# Patient Record
Sex: MALE | Race: BLACK OR AFRICAN AMERICAN | Employment: OTHER | URBAN - METROPOLITAN AREA
[De-identification: names, ages, dates, MRNs, and addresses within clinical notes are randomized per-mention and may not be internally consistent; named-entity substitution may affect disease eponyms.]

---

## 2022-06-08 ENCOUNTER — HOSPITAL ENCOUNTER (OUTPATIENT)
Dept: MRI IMAGING | Age: 57
Discharge: HOME | End: 2022-06-11
Payer: COMMERCIAL

## 2022-06-08 DIAGNOSIS — M21.372 FOOT DROP, LEFT: ICD-10-CM

## 2022-06-08 DIAGNOSIS — M54.16 LUMBAR RADICULOPATHY: ICD-10-CM

## 2022-06-08 PROCEDURE — 72148 MRI LUMBAR SPINE W/O DYE: CPT

## 2022-08-29 ENCOUNTER — OFFICE VISIT (OUTPATIENT)
Dept: NEUROSURGERY | Age: 57
End: 2022-08-29
Payer: COMMERCIAL

## 2022-08-29 VITALS
HEART RATE: 103 BPM | WEIGHT: 263 LBS | DIASTOLIC BLOOD PRESSURE: 88 MMHG | OXYGEN SATURATION: 96 % | SYSTOLIC BLOOD PRESSURE: 121 MMHG | HEIGHT: 78 IN | BODY MASS INDEX: 30.43 KG/M2 | TEMPERATURE: 97 F

## 2022-08-29 DIAGNOSIS — M48.061 SPINAL STENOSIS OF LUMBAR REGION, UNSPECIFIED WHETHER NEUROGENIC CLAUDICATION PRESENT: Primary | ICD-10-CM

## 2022-08-29 DIAGNOSIS — M54.16 LUMBAR RADICULOPATHY: ICD-10-CM

## 2022-08-29 DIAGNOSIS — M21.371 RIGHT FOOT DROP: ICD-10-CM

## 2022-08-29 PROCEDURE — 99214 OFFICE O/P EST MOD 30 MIN: CPT | Performed by: NURSE PRACTITIONER

## 2022-08-29 RX ORDER — OMEPRAZOLE 20 MG/1
CAPSULE, DELAYED RELEASE ORAL
COMMUNITY
Start: 2022-07-27

## 2022-08-29 RX ORDER — NALOXONE HYDROCHLORIDE 4 MG/.1ML
1 SPRAY NASAL
COMMUNITY
Start: 2022-08-22

## 2022-08-29 RX ORDER — ONDANSETRON 4 MG/1
TABLET, ORALLY DISINTEGRATING ORAL
COMMUNITY
Start: 2022-08-22

## 2022-08-29 RX ORDER — DIAZEPAM 5 MG/1
TABLET ORAL
COMMUNITY
Start: 2022-05-27

## 2022-08-29 RX ORDER — HYDROCODONE BITARTRATE AND ACETAMINOPHEN 7.5; 325 MG/1; MG/1
TABLET ORAL
COMMUNITY
Start: 2022-08-03

## 2022-08-29 RX ORDER — ERGOCALCIFEROL (VITAMIN D2) 1250 MCG
CAPSULE ORAL
COMMUNITY
Start: 2022-06-22

## 2022-08-29 RX ORDER — CYCLOBENZAPRINE HCL 10 MG
10 TABLET ORAL PRN
COMMUNITY
Start: 2022-03-24

## 2022-08-29 RX ORDER — GABAPENTIN 800 MG/1
TABLET ORAL
COMMUNITY
Start: 2021-12-02

## 2022-08-29 RX ORDER — OXYCODONE AND ACETAMINOPHEN 7.5; 325 MG/1; MG/1
1 TABLET ORAL EVERY 6 HOURS PRN
COMMUNITY
Start: 2022-08-22

## 2022-08-29 RX ORDER — EZETIMIBE 10 MG/1
TABLET ORAL
COMMUNITY
Start: 2022-08-10

## 2022-08-29 RX ORDER — ATORVASTATIN CALCIUM 10 MG/1
1 TABLET, FILM COATED ORAL DAILY
COMMUNITY
Start: 2021-10-29

## 2022-08-29 RX ORDER — AMOXICILLIN 250 MG
1 CAPSULE ORAL DAILY
COMMUNITY
Start: 2022-08-22 | End: 2022-09-21

## 2022-08-29 RX ORDER — TESTOSTERONE CYPIONATE 200 MG/ML
INJECTION INTRAMUSCULAR
COMMUNITY
Start: 2022-08-01

## 2022-08-29 ASSESSMENT — PATIENT HEALTH QUESTIONNAIRE - PHQ9
SUM OF ALL RESPONSES TO PHQ9 QUESTIONS 1 & 2: 0
SUM OF ALL RESPONSES TO PHQ QUESTIONS 1-9: 0
2. FEELING DOWN, DEPRESSED OR HOPELESS: 0
SUM OF ALL RESPONSES TO PHQ QUESTIONS 1-9: 0
1. LITTLE INTEREST OR PLEASURE IN DOING THINGS: 0
SUM OF ALL RESPONSES TO PHQ QUESTIONS 1-9: 0
SUM OF ALL RESPONSES TO PHQ QUESTIONS 1-9: 0

## 2022-08-29 NOTE — PROGRESS NOTES
[facility] NOTE:   History of Present Illness:    CC: Lumbar MRI review    Desire Blue is a 59 y.o. male here to review his lumbar MRI. Patient states he still hurting and he has not seen anyone for his right shoulder yet. The lumbar MRI reveals bilateral foraminal stenosis at L5-S1; severe stenosis at L4-5,L3-4, L2-3, and L1-2. Past Medical History:   Diagnosis Date    GERD (gastroesophageal reflux disease)     Hypercholesterolemia     Hypertension     JANY (obstructive sleep apnea)       No past surgical history on file.   No Known Allergies   Family History   Problem Relation Age of Onset    Stroke Father     Hypertension Father     Heart Disease Father     Hypertension Mother     Osteoarthritis Mother     COPD Mother       Social History     Socioeconomic History    Marital status: Unknown     Spouse name: Not on file    Number of children: Not on file    Years of education: Not on file    Highest education level: Not on file   Occupational History    Not on file   Tobacco Use    Smoking status: Every Day    Smokeless tobacco: Never   Substance and Sexual Activity    Alcohol use: Yes    Drug use: Never    Sexual activity: Not on file   Other Topics Concern    Not on file   Social History Narrative    Not on file     Social Determinants of Health     Financial Resource Strain: Not on file   Food Insecurity: Not on file   Transportation Needs: Not on file   Physical Activity: Not on file   Stress: Not on file   Social Connections: Not on file   Intimate Partner Violence: Not on file   Housing Stability: Not on file     Current Outpatient Medications   Medication Sig Dispense Refill    atorvastatin (LIPITOR) 10 MG tablet Take 1 tablet by mouth daily      cyclobenzaprine (FLEXERIL) 10 MG tablet Take 10 mg by mouth as needed      diazePAM (VALIUM) 5 MG tablet PLEASE SEE ATTACHED FOR DETAILED DIRECTIONS      ergocalciferol (ERGOCALCIFEROL) 1.25 MG (81259 UT) capsule TAKE 1 CAPSULE BY MOUTH ONCE A WEEK FOR 90 DAYS      ezetimibe (ZETIA) 10 MG tablet TAKE 1 TABLET BY MOUTH EVERY DAY FOR 90 DAYS      gabapentin (NEURONTIN) 800 MG tablet TAKE 1 TABLET BY MOUTH FOUR TIMES A DAY      HYDROcodone-acetaminophen (NORCO) 7.5-325 MG per tablet TAKE 1 TABLET AS NEEDED ORALLY EVERY 4-6 HOURS 5 DAYS      naloxone 4 MG/0.1ML LIQD nasal spray 1 spray by Nasal route once as needed      omeprazole (PRILOSEC) 20 MG delayed release capsule TAKE 1 CAPSULE BY MOUTH EVERY DAY IN THE MORNING BEFORE BREAKFAST ORAL ONCE A DAY 90 DAYS      ondansetron (ZOFRAN-ODT) 4 MG disintegrating tablet TAKE 1-2 TABLETS BY MOUTH EVERY 8 HOURS AS NEEDED      oxyCODONE-acetaminophen (PERCOCET) 7.5-325 MG per tablet Take 1 tablet by mouth every 6 hours as needed. senna-docusate (PERICOLACE) 8.6-50 MG per tablet Take 1 tablet by mouth daily      testosterone cypionate (DEPOTESTOTERONE CYPIONATE) 200 MG/ML injection INJECT 2ML INTRAMUSCULARLY MONTHLY, EVERY 30 DAYS       No current facility-administered medications for this visit. There is no problem list on file for this patient. ROS Review of Systems    Constitutional:                    No recent weight changes, fever, fatigue, sleep difficulties, loss of appetite   ENT/Mouth:  No hearing loss, No ringing in the ears, chronic sinus problem, nose bleeds,sore throat, voice change, hoarseness, swollen glands in neck, or difficulties with chewing and swallowing. Cardiovascular:  No chest pain/angina pectoris, palpitations, swelling of feet/ankles/hands, or calf pain while walking. Respiratory: No chronic or frequent coughs, spitting up blood, shortness of breath, No asthma, or wheezing.      Gastrointestinal: No a bdominal pain, heartburn, nausea, vomiting, constipation, or frequent diarrhea     Genitourinary: No frequent urination, burning or painful urination, or blood in urine     Musculoskeletal:   POS low back pain and leg pain and weakness Integument:   No rash/itching     Neurological:   Dizziness/vertigo, No numbness/tingling sensation, tremors, No weakness in limbs, frequent or recurring headaches, memory loss or confusion. Physical Exam:    General: No acute distress  Head normocephalic and atraumatic  Mood and affect appropriate  CV: Regular rate   Resp: No increased work of breathing  Skin: warm and dry   Awake, alert, and oriented   Speech fluent  Eyes open spontaneously   Face symmetric and tongue midline on protrusion  Sternocleidomastoid and trapezius 5/5  No mid-line cervical, thoracic, or lumbar tenderness to palpation   Patient with strength exam as follows:   Upper Extremities: Right Left      Deltoid  +4 5    Biceps  5 5    Triceps  5 5      5 5   Hand Intrinsics  5 5  Wrist flexors/extensors  5 5     Lower Extremities:      Hip Flex 5 5    Quads  5 5    Hamstrings 5 5    Dorsiflex 4 5    Plantarflex 4 5      Sensation intact to light touch and pin-prick   DTR 2+  No clonus or babinski present   No Carrington's sign present bilaterally   Gait unsteady due to a right foot drop    Assessment & Plan:  Biju Cavazos is a 59 y.o. male who presents to review his lumbar MRI. I have intimately reviewed and interpreted patient's imaging and do feel that the patient needs a neurosurgical intervention. The patient is to follow-up with Dr. Yoli Ingram to discuss his surgical options. No diagnosis found. Notes are transcribed with [employer], a medical voice recording dictation service, and may contain minor errors.     Silvester Cowden, ANNA  [address]

## 2022-08-30 ENCOUNTER — OFFICE VISIT (OUTPATIENT)
Dept: NEUROSURGERY | Age: 57
End: 2022-08-30
Payer: COMMERCIAL

## 2022-08-30 ENCOUNTER — TELEPHONE (OUTPATIENT)
Dept: NEUROSURGERY | Age: 57
End: 2022-08-30

## 2022-08-30 ENCOUNTER — HOSPITAL ENCOUNTER (OUTPATIENT)
Dept: GENERAL RADIOLOGY | Age: 57
Discharge: HOME | End: 2022-09-02
Payer: COMMERCIAL

## 2022-08-30 VITALS
TEMPERATURE: 97.8 F | SYSTOLIC BLOOD PRESSURE: 137 MMHG | WEIGHT: 263 LBS | HEART RATE: 93 BPM | BODY MASS INDEX: 30.43 KG/M2 | DIASTOLIC BLOOD PRESSURE: 96 MMHG | HEIGHT: 78 IN | OXYGEN SATURATION: 97 %

## 2022-08-30 DIAGNOSIS — M21.371 FOOT DROP, RIGHT FOOT: ICD-10-CM

## 2022-08-30 DIAGNOSIS — M54.16 LUMBAR RADICULOPATHY: ICD-10-CM

## 2022-08-30 DIAGNOSIS — M48.061 SPINAL STENOSIS OF LUMBAR REGION, UNSPECIFIED WHETHER NEUROGENIC CLAUDICATION PRESENT: ICD-10-CM

## 2022-08-30 DIAGNOSIS — M41.9 SCOLIOSIS, UNSPECIFIED SCOLIOSIS TYPE, UNSPECIFIED SPINAL REGION: ICD-10-CM

## 2022-08-30 DIAGNOSIS — M51.36 DDD (DEGENERATIVE DISC DISEASE), LUMBAR: ICD-10-CM

## 2022-08-30 DIAGNOSIS — M21.371 FOOT DROP, RIGHT FOOT: Primary | ICD-10-CM

## 2022-08-30 DIAGNOSIS — Z72.0 TOBACCO ABUSE: ICD-10-CM

## 2022-08-30 PROCEDURE — 72100 X-RAY EXAM L-S SPINE 2/3 VWS: CPT

## 2022-08-30 PROCEDURE — 99204 OFFICE O/P NEW MOD 45 MIN: CPT | Performed by: NEUROLOGICAL SURGERY

## 2022-08-30 ASSESSMENT — PATIENT HEALTH QUESTIONNAIRE - PHQ9
2. FEELING DOWN, DEPRESSED OR HOPELESS: 0
SUM OF ALL RESPONSES TO PHQ QUESTIONS 1-9: 0
SUM OF ALL RESPONSES TO PHQ QUESTIONS 1-9: 0
1. LITTLE INTEREST OR PLEASURE IN DOING THINGS: 0
SUM OF ALL RESPONSES TO PHQ9 QUESTIONS 1 & 2: 0
SUM OF ALL RESPONSES TO PHQ QUESTIONS 1-9: 0
SUM OF ALL RESPONSES TO PHQ QUESTIONS 1-9: 0

## 2022-08-30 NOTE — PROGRESS NOTES
History of Present Illness    Patient Words: 59 y.o. This patient is a 59 y.o. male who presents today for neurosurgical evaluation. The patient describes a multi month history of right lower extremity weakness and foot drop and a multiyear history of low back pain. Chronic tobacco abuser. He has had pain management and physical therapy in the past.  He continues to smoke cigarettes. MRI scan of lumbar spine does reveal widespread degenerative disc disease throughout the lumbar spine and spinal stenosis L3-4 and L4  With disc bulging everywhere. Scoliosis appears to be present in the mid lumbar region as well. He does describe bilateral low back pain and sciatica. He says that the right foot slaps when he walks.     Past Medical History:   Diagnosis Date    GERD (gastroesophageal reflux disease)     Hypercholesterolemia     Hypertension     JANY (obstructive sleep apnea)         No Known Allergies     Family History   Problem Relation Age of Onset    Stroke Father     Hypertension Father     Heart Disease Father     Hypertension Mother     Osteoarthritis Mother     COPD Mother         Social History     Socioeconomic History    Marital status: Unknown     Spouse name: Not on file    Number of children: Not on file    Years of education: Not on file    Highest education level: Not on file   Occupational History    Not on file   Tobacco Use    Smoking status: Every Day    Smokeless tobacco: Never   Substance and Sexual Activity    Alcohol use: Yes    Drug use: Never    Sexual activity: Not on file   Other Topics Concern    Not on file   Social History Narrative    Not on file     Social Determinants of Health     Financial Resource Strain: Not on file   Food Insecurity: Not on file   Transportation Needs: Not on file   Physical Activity: Not on file   Stress: Not on file   Social Connections: Not on file   Intimate Partner Violence: Not on file   Housing Stability: Not on file       Current Outpatient Medications   Medication Sig Dispense Refill    atorvastatin (LIPITOR) 10 MG tablet Take 1 tablet by mouth daily      cyclobenzaprine (FLEXERIL) 10 MG tablet Take 10 mg by mouth as needed      diazePAM (VALIUM) 5 MG tablet PLEASE SEE ATTACHED FOR DETAILED DIRECTIONS      ergocalciferol (ERGOCALCIFEROL) 1.25 MG (46701 UT) capsule TAKE 1 CAPSULE BY MOUTH ONCE A WEEK FOR 90 DAYS      ezetimibe (ZETIA) 10 MG tablet TAKE 1 TABLET BY MOUTH EVERY DAY FOR 90 DAYS      gabapentin (NEURONTIN) 800 MG tablet TAKE 1 TABLET BY MOUTH FOUR TIMES A DAY      HYDROcodone-acetaminophen (NORCO) 7.5-325 MG per tablet TAKE 1 TABLET AS NEEDED ORALLY EVERY 4-6 HOURS 5 DAYS      naloxone 4 MG/0.1ML LIQD nasal spray 1 spray by Nasal route once as needed      omeprazole (PRILOSEC) 20 MG delayed release capsule TAKE 1 CAPSULE BY MOUTH EVERY DAY IN THE MORNING BEFORE BREAKFAST ORAL ONCE A DAY 90 DAYS      ondansetron (ZOFRAN-ODT) 4 MG disintegrating tablet TAKE 1-2 TABLETS BY MOUTH EVERY 8 HOURS AS NEEDED      oxyCODONE-acetaminophen (PERCOCET) 7.5-325 MG per tablet Take 1 tablet by mouth every 6 hours as needed. senna-docusate (PERICOLACE) 8.6-50 MG per tablet Take 1 tablet by mouth daily      testosterone cypionate (DEPOTESTOTERONE CYPIONATE) 200 MG/ML injection INJECT 2ML INTRAMUSCULARLY MONTHLY, EVERY 30 DAYS       No current facility-administered medications for this visit. Patient Active Problem List   Diagnosis    Tobacco abuse    DDD (degenerative disc disease), lumbar    Scoliosis    Spinal stenosis of lumbar region    Lumbar radiculopathy    Foot drop, right foot        Review of Systems: A complete ROS was done and as stated in the HPI or otherwise negative. BP (!) 137/96   Pulse 93   Temp 97.8 °F (36.6 °C) (Temporal)   Ht 6' 7\" (2.007 m)   Wt 263 lb (119.3 kg)   SpO2 97%   BMI 29.63 kg/m²        Physical Exam       Cranial Nerves:   Intact visual fields. Fundi are benign.  DAY, EOM's full, no nystagmus, no ptosis. Facial sensation is normal. Corneal reflexes are intact. Facial movement is symmetric. Hearing is normal bilaterally. Palate is midline with normal sternocleidomastoid and trapezius muscles are normal. Tongue is midline. Motor:  5/5 strength in upper and lower proximal and distal muscles except 2/5 right dorsiflexor weakness. Normal bulk and tone. No fasciculations. Reflexes:   Deep tendon reflexes 2+/4 and symmetrical.   Sensory:   Normal to touch, pinprick and vibration. Gait:  Use of a cane   Tremor:   No tremor noted. Cerebellar:  No cerebellar signs present. Assessment & Plan      ICD-10-CM    1. Foot drop, right foot  M21.371       2. Lumbar radiculopathy  M54.16       3. Spinal stenosis of lumbar region, unspecified whether neurogenic claudication present  M48.061       4. Scoliosis, unspecified scoliosis type, unspecified spinal region  M41.9       5. DDD (degenerative disc disease), lumbar  M51.36       6. Tobacco abuse  Z72.0       Surgery will require a multilevel major reconstruction because of the severe nature of the disease including scoliosis. He would need to be tobacco free for a minimum of 6 weeks. We will prescribe a right ankle AFO brace for him. He management referral to see if there is any other options. Advised to quit smoking cigarettes as this will need to be accomplished prior to any surgical consideration. He may need a referral to a tertiary care spine center given the extensive nature of his disease. We will obtain AP and lateral lumbar spine x-rays today as well.   Pain management referral.    Mateusz Mark MD

## 2022-08-30 NOTE — TELEPHONE ENCOUNTER
Spoke to patient and explained the reason for a AFO brace - explained to him about pain management offices- advised for him to check with his insurance what facility might be in network- he expressed our office visit was a waste of time for him- I apologized profusely and he told me he had to  his kids

## 2022-08-30 NOTE — TELEPHONE ENCOUNTER
Called to inform patient that [facility] has nothing to offer per fax. Shahab Barnes suggested that pt be seen at [address] [place], [place] [address]age  in [facility], pt refused to go to that office. [facility] does NOT take his insurance.

## 2022-08-31 ENCOUNTER — TELEPHONE (OUTPATIENT)
Dept: NEUROSURGERY | Age: 57
End: 2022-08-31

## 2022-08-31 NOTE — TELEPHONE ENCOUNTER
Please check on pts pain mgnt referral he did state if the [facility] office didn't work out he would come to [facility] [employer] [facility], im not sure if hes seen them in the past?    Also please fax the AFO brace to [facility]. Thank you much.

## 2022-10-11 ENCOUNTER — TELEPHONE (OUTPATIENT)
Dept: NEUROSURGERY | Age: 57
End: 2022-10-11

## 2022-10-11 DIAGNOSIS — M51.36 DDD (DEGENERATIVE DISC DISEASE), LUMBAR: ICD-10-CM

## 2022-10-11 DIAGNOSIS — M54.16 LUMBAR RADICULOPATHY: ICD-10-CM

## 2022-10-11 DIAGNOSIS — M48.061 SPINAL STENOSIS OF LUMBAR REGION, UNSPECIFIED WHETHER NEUROGENIC CLAUDICATION PRESENT: ICD-10-CM

## 2022-10-11 DIAGNOSIS — M54.16 RADICULOPATHY, LUMBAR REGION: ICD-10-CM

## 2022-10-11 DIAGNOSIS — M41.9 SCOLIOSIS, UNSPECIFIED SCOLIOSIS TYPE, UNSPECIFIED SPINAL REGION: ICD-10-CM

## 2022-10-11 DIAGNOSIS — M21.371 FOOT DROP, RIGHT FOOT: Primary | ICD-10-CM

## 2022-10-11 NOTE — TELEPHONE ENCOUNTER
Pt had AFO fitted and has been wearing but states he thinks its making his back worse. He had original referral sent to pain mgnt group in [facility] who hes seen prior but stated they had nothing to offer   So we referred him to pain mgnt in [place] -he states he doesn't have a job and cant afford to drive there. States hes taking care of his 2 grandchildren and cant have a surgery bc he doesn't have any help. Please call regarding thanks. Dr. Kristian Valdez just sent him for xrs of his Lumbar and hips?

## 2022-10-11 NOTE — TELEPHONE ENCOUNTER
Spoke to patient he states he would like to go to pain management in [place] and he will save up gas money to go-  He states the AFO prevents his foot from slapping on the ground but now has increased hip and back pain- will make another referral to pain management in [place] where the patient wants to go- [facility]- patient given the phone # [phone] at 1:38 for follow up- he still has not set his VM up yet

## 2023-07-06 ENCOUNTER — TELEPHONE (OUTPATIENT)
Dept: NEUROSURGERY | Age: 58
End: 2023-07-06

## 2023-07-06 NOTE — TELEPHONE ENCOUNTER
Patient called wanting to know what the down time is for spine surgery- he just had his hip repaired- advised him of DR. Menchaca's office note from last year about major reconstructive surgery to a tertiary facility.  Patient would like a referral- message placed to Dr. Joel Allen were referral should be placed